# Patient Record
(demographics unavailable — no encounter records)

---

## 2024-12-04 NOTE — HISTORY OF PRESENT ILLNESS
[Parents] : parents [Fruit] : fruit [Vegetables] : vegetables [Meat] : meat [Firm] : stools are firm consistency [Wakes up at night] : Wakes up at night [Brushing teeth] : Brushing teeth [Yes] : Patient goes to dentist yearly [Toothpaste] : Primary Fluoride Source: Toothpaste [Playtime 60 min a day] : Playtime 60 min a day [<2 hrs of screen time] : Less than 2 hrs of screen time [No] : No cigarette smoke exposure [Car seat in back seat] : Car seat in back seat [Up to date] : Up to date [de-identified] : 1 yogurt per day

## 2024-12-04 NOTE — DISCUSSION/SUMMARY
[Assessment of Language Development] : assessment of language development [Temperament and Behavior] : temperament and behavior [Toilet Training] : toilet training [TV Viewing] : tv viewing [Safety] : safety [Mother] : mother [Father] : father [] : The components of the vaccine(s) to be administered today are listed in the plan of care. The disease(s) for which the vaccine(s) are intended to prevent and the risks have been discussed with the caretaker.  The risks are also included in the appropriate vaccination information statements which have been provided to the patient's caregiver.  The caregiver has given consent to vaccinate. [FreeTextEntry1] :  2 year female growing and developing well.  Continue cow's milk. Continue table foods, 3 meals with 2-3 snacks per day. Incorporate flourinated water daily in a sippy cup. Brush teeth twice a day with soft toothbrush. Recommend visit to dentist. When in car, keep child in rear-facing car seats until age 2, or until  the maximum height and weight for seat is reached. Put toddler to sleep in own bed. Help toddler to maintain consistent daily routines and sleep schedule. Toilet training discussed. Ensure home is safe. Use consistent, positive discipline. Read aloud to toddler. Limit screen time to no more than 2 hours per day. Counseling provided on vaccines given today.

## 2024-12-04 NOTE — DEVELOPMENTAL MILESTONES
[Scoops well with spoon] : scoops well with spoon [Uses 50 words] : does not use 50 words [Combine 2 words into phrase or] : combines 2 words into phrase or sentences [Kicks ball] : kicks ball  [Jumps off ground with 2 feet] : jumps off ground with 2 feet [Stacks objects] : stacks objects

## 2024-12-04 NOTE — PHYSICAL EXAM

## 2025-04-29 NOTE — HISTORY OF PRESENT ILLNESS
[de-identified] : runny nose and cough [FreeTextEntry6] : 2  year old pt with few days history of congestion, runny nose, and no fever.  Active Alert Playful NL po Nl uop No rash Pt is coughing sporadically throughout the day but mostly at night

## 2025-04-29 NOTE — DISCUSSION/SUMMARY
[FreeTextEntry1] : Recommend supportive care including antipyretics, fluids, OTC cough/cold medications if age-appropriate, and nasal saline followed by nasal suction. Return if symptoms worsen or persist. Chestal honey, Zarbees, or cold calm as directed Discussed signs and symptoms of respiratory distress and lower respiratory illness including but not limited to increased RR, retractions, and nasal flaring.  All questions answered. Caretaker understands and agrees with plan.

## 2025-06-23 NOTE — DISCUSSION/SUMMARY
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 In order to maintain hydration consume "oral rehydration solution," such as Pedialyte or low calorie sports drinks. If vomiting, try to give child a few teaspoons of fluid every few minutes. Avoid drinking juice or soda. These can make diarrhea worse. its best to consume lean meats, fruits, vegetables, and whole-grain breads and cereals. Avoid eating foods with a lot of fat or sugar, which can make symptoms worse.  If symptoms persist, fever, blood in stool return to office.

## 2025-06-23 NOTE — HISTORY OF PRESENT ILLNESS
[GI Symptoms] : GI SYMPTOMS [Diarrhea] : diarrhea [___ Week(s)] : [unfilled] week(s) [Constant] : constant [# of episodes of diarrhea: ___] : Number of episodes of diarrhea: [unfilled] [Eating] : eating [Recent travel: ___] : no recent travel [Excessive Juice Intake] : no excessive juice intake [Excessive Milk Intake] : no excessive milk intake [Recent Antibiotic Use] : no recent antibiotic use [URI symptoms] : no URI symptoms [Decreased Appetite] : no decreased appetite [Abdominal Pain] : no abdominal pain